# Patient Record
Sex: FEMALE | Race: WHITE | Employment: FULL TIME | URBAN - METROPOLITAN AREA
[De-identification: names, ages, dates, MRNs, and addresses within clinical notes are randomized per-mention and may not be internally consistent; named-entity substitution may affect disease eponyms.]

---

## 2019-11-07 ENCOUNTER — OFFICE VISIT (OUTPATIENT)
Dept: CARDIOLOGY CLINIC | Facility: CLINIC | Age: 39
End: 2019-11-07
Payer: COMMERCIAL

## 2019-11-07 VITALS
OXYGEN SATURATION: 99 % | HEART RATE: 68 BPM | HEIGHT: 61 IN | DIASTOLIC BLOOD PRESSURE: 80 MMHG | WEIGHT: 147 LBS | BODY MASS INDEX: 27.75 KG/M2 | SYSTOLIC BLOOD PRESSURE: 130 MMHG

## 2019-11-07 DIAGNOSIS — R06.00 DYSPNEA ON EXERTION: ICD-10-CM

## 2019-11-07 DIAGNOSIS — R00.2 HEART PALPITATIONS: Primary | ICD-10-CM

## 2019-11-07 DIAGNOSIS — R68.89 EXERCISE INTOLERANCE: ICD-10-CM

## 2019-11-07 DIAGNOSIS — E66.3 OVERWEIGHT (BMI 25.0-29.9): ICD-10-CM

## 2019-11-07 DIAGNOSIS — R07.89 CHEST TIGHTNESS: ICD-10-CM

## 2019-11-07 PROCEDURE — 93000 ELECTROCARDIOGRAM COMPLETE: CPT | Performed by: INTERNAL MEDICINE

## 2019-11-07 PROCEDURE — 99205 OFFICE O/P NEW HI 60 MIN: CPT | Performed by: INTERNAL MEDICINE

## 2019-11-07 NOTE — PATIENT INSTRUCTIONS
1  Have requested echo Doppler study, 48 hour Holter monitor, and treadmill stress test to be done in the next 1-2 weeks at [facility] and will contact patient as these results come in   2  No new medication recommended for now  3  No restrictions on activity suggested  4  Have also requested CBC, CMP, lipid panel, TSH level to be done in near future, preferably in conjunction with the above tests at [address] [place] [address]

## 2019-11-07 NOTE — PROGRESS NOTES
HISTORY & PHYSICAL  Stanton Floewrs 42 y o  female MRN: [id number]  Unit/Bed#:  Encounter: [id number]  Assessment:    1  Daily heart flutters with associated shock-like left precordial pain for just over one month  2  Chronic exertional dyspnea, pounding of heart, exercise intolerance, associated nausea  3  Overweight  4  No evidence of active cardiac disease by examination or EKG  Plan:      Patient Instructions     1  Have requested echo Doppler study, 48 hour Holter monitor, and treadmill stress test to be done in the next 1-2 weeks at [facility] and will contact patient as these results come in   2  No new medication recommended for now  3  No restrictions on activity suggested  4  Have also requested CBC, CMP, lipid panel, TSH level to be done in near future, preferably in conjunction with the above tests at [address] [place] [address]  No current outpatient medications on file  No current facility-administered medications for this visit  Counseling :   A description of the counseling / coordination of care:  Explain discrepancy between physical findings, EKG and symptoms and the need for further testing to clarify the cause       Goals and Barriers:  Diagnosis and manage the palpitations, exertional dyspnea, and chest tightness  Patient's ability to self care:  None        History of Present Illness     Chief Complaint:    HPI:     Stanton Flowers is a 42y o  year old female who presents with just over one month long history of daily flutters in the chest with associated shock like left precordial pain with episodes lasting on average for 30 seconds up to a maximum of 4 minutes and occurring about every 5 minutes  These are most noticeable during rest   She also gets an occasional flip-flop sensation in her chest of recent onset  She may have had this in the past on occasion but only for a day or so    She has never had episodes like this occurring on a daily basis for such a long period of time  This patient also has chronic exertional dyspnea and chest tightness with associated pounding of her heart and nausea, though recent analysis of her Fit Bit device showed maximum heart rate to be 106 bpm during activity  She tries to use a treadmill for exercise and also does exercise videos that last for 20-30 minutes  She finds that she has to stop and rest after 5 minutes of treadmill or exercise video activity, but then resumes her activity at a lower intensity despite the presence of the dyspnea and the pounding of the heart  She can walk in the treadmill an additional 5-10 minutes after the initial rest period  The patient denies syncope, lightheadedness, dizziness, edema, cough, sputum production, wheezing, orthopnea, paroxysmal nocturnal dyspnea, snoring, apnea  As a youngster, she did have exercise-induced asthma, but not as an adult  This patient has never been hospitalized for any medical illnesses  She has had same day lipoma excisions from her eye orbit and from the right biceps area, tonsillectomy at age 23, deviated septum surgery at age 23, cholecystectomy   The patient's family history is pertinent for maternal uncle having a massive heart attack in his 46s but is still alive at age 79 with ischemic cardiomyopathy and possible atrial fibrillation  A paternal grandmother also had heart disease and heart failure  at age 80  A paternal grandfather  at age 80 and had questionable heart disease and Parkinson's disease  She has two brothers and one sister with one brother being prediabetic  Her mother is diabetic and father has pre diabetes and hypertension  The patient works 50 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 [employer] and is on her feet for 8-9 hours a day  She lives at home with her , who works as a supervisor at [employer] and works the night shift    She has three children ages [de-identified], [de-identified], and four   Her mother and  watch the 3year-old child  She is normally off on Sundays  The patient quit smoking about 12 years ago and was a one pack per day smoker for 10 years  She rarely if ever has alcohol and has never used illicit drugs  Historical Information   History reviewed  No pertinent past medical history  Past Surgical History:   Procedure Laterality Date    CHOLECYSTECTOMY      EYE SURGERY Left     SINUS SURGERY      TONSILLECTOMY       Social History     Substance and Sexual Activity   Alcohol Use Not Currently    Frequency: Monthly or less    Comment: very little     Social History     Substance and Sexual Activity   Drug Use Not on file     Social History     Tobacco Use   Smoking Status Former Smoker   Smokeless Tobacco Never Used   Tobacco Comment    eleven years ago     Family History   Problem Relation Age of Onset    Hypertension Father     Heart disease Maternal Uncle     Heart failure Maternal Uncle        Meds/Allergies   Prior to Admission medications    Not on File     Allergies   Allergen Reactions    Acetaminophen Vomiting     Reaction Date: [id number];        Cardiovascular ROS:     Systems negative, except as noted in history of present illness  Objective   Vitals:   Vitals:    11/07/19 1033   BP: 130/80   BP Location: Right arm   Patient Position: Sitting   Cuff Size: Standard   Pulse: 68   SpO2: 99%   Weight: 66 7 kg (147 lb)   Height: 5' 1" (1 549 m)       Body mass index is 27 78 kg/m²  Physical Exam    General-Well-developed well-nourished mildly overweight  female  in no acute distress  Patient is alert, oriented X3 and cooperative  Skin-Warm and dry with no pallor, rashes, ecchymoses, lesions  HEENT-Normocephalic  Pupils equally round and reactive to light and accommodation  Extraocular muscles intact  Mucous membranes pink and moist  Sclerae nonicteric  Optic fundi poorly seen    Neck-No jugular venous distention, AJR, masses, thyromegaly, adenopathy, bruits  Lungs-No rales, rhonchi, wheezes  Heart-No murmur, gallop, rub, click, heave, thrill  Abdomen-Normal bowel sounds with no masses, organomegaly, guarding, tenderness, or rigidity  Extremities-No cyanosis, clubbing, edema, pulse decrease  Neurological-No focal neurological signs  Imaging:      EKG 11/07/2019:    Normal ECG      Chest x-ray:    No Chest XR results available for this patient  Cardiac testing:     None available      Labs:     No results found for: WBC, HGB, HCT, MCV, PLT  No results found for: SODIUM, K, CL, CO2, ANIONGAP, BUN, CREATININE, GLUCOSE, GLUF, CALCIUM, CORRECTEDCA, AST, ALT, ALKPHOS, PROT, BILITOT, EGFR  No results found for: GLUCOSE, CALCIUM, NA, K, CO2, CL, BUN, CREATININE  No results found for: BNP   No results found for: TSH  No results found for: CHOLESTEROL  No results found for: HDL  No results found for: LDLCHOLEST  No results found for: LDLCALC  No components found for: DIRECTLDLREFLEX  No results found for: TRIG  No results found for: CHOLHDL  No results found for: CKTOTAL, CKMB, CKMBINDEX, TROPONINI      Total visit time spent today 67                                      minutes

## 2019-12-17 ENCOUNTER — HOSPITAL ENCOUNTER (OUTPATIENT)
Dept: NON INVASIVE DIAGNOSTICS | Facility: HOSPITAL | Age: 39
Discharge: HOME | End: 2019-12-17
Attending: INTERNAL MEDICINE
Payer: COMMERCIAL

## 2019-12-17 DIAGNOSIS — R06.00 DYSPNEA ON EXERTION: ICD-10-CM

## 2019-12-17 DIAGNOSIS — R00.2 HEART PALPITATIONS: ICD-10-CM

## 2019-12-17 DIAGNOSIS — R07.89 CHEST TIGHTNESS: ICD-10-CM

## 2019-12-17 LAB
CHEST PAIN STATEMENT: NORMAL
MAX DIASTOLIC BP: 80 MMHG
MAX HEART RATE: 171 BPM
MAX PREDICTED HEART RATE: 183 BPM
MAX. SYSTOLIC BP: 176 MMHG
PROTOCOL NAME: NORMAL
TARGET HR FORMULA: NORMAL
TEST INDICATION: NORMAL
TIME IN EXERCISE PHASE: NORMAL

## 2019-12-17 PROCEDURE — 93225 XTRNL ECG REC<48 HRS REC: CPT

## 2019-12-17 PROCEDURE — 93017 CV STRESS TEST TRACING ONLY: CPT

## 2019-12-17 PROCEDURE — 93226 XTRNL ECG REC<48 HR SCAN A/R: CPT

## 2019-12-17 PROCEDURE — 93306 TTE W/DOPPLER COMPLETE: CPT

## 2019-12-18 PROCEDURE — 93018 CV STRESS TEST I&R ONLY: CPT | Performed by: INTERNAL MEDICINE

## 2019-12-18 PROCEDURE — 93306 TTE W/DOPPLER COMPLETE: CPT | Performed by: INTERNAL MEDICINE

## 2019-12-18 PROCEDURE — 93016 CV STRESS TEST SUPVJ ONLY: CPT | Performed by: INTERNAL MEDICINE

## 2019-12-19 ENCOUNTER — TELEPHONE (OUTPATIENT)
Dept: CARDIOLOGY CLINIC | Facility: CLINIC | Age: 39
End: 2019-12-19

## 2019-12-19 NOTE — TELEPHONE ENCOUNTER
----- Message from Sasha Curtis MD sent at 12/18/2019  7:04 PM EST -----  Notify patient that stress test and echocardiogram were both normal, and blood work from 12/03/2019 was also normal   We are awaiting the 48 hour Holter monitor report, which may take into next week before the report is analyzed  We will contact the patient with that report and any other recommendations we have at that time  For now, there is no cause for concern

## 2020-01-03 ENCOUNTER — TELEPHONE (OUTPATIENT)
Dept: CARDIOLOGY CLINIC | Facility: CLINIC | Age: 40
End: 2020-01-03

## 2020-01-03 NOTE — TELEPHONE ENCOUNTER
Please call cath lab as soon as possible and have them get Holter monitor read as soon as possible since it was done more than two weeks ago  Please place report in my in basket as soon as possible

## 2020-01-03 NOTE — TELEPHONE ENCOUNTER
Called patient to make aware that we are working on attaining report for her  I will call cath lab Monday

## 2020-01-03 NOTE — TELEPHONE ENCOUNTER
Patient called asking about holter monitor results  Explained that with the holiday it could have hindered results

## 2020-01-17 PROCEDURE — 93227 XTRNL ECG REC<48 HR R&I: CPT | Performed by: INTERNAL MEDICINE

## 2020-01-20 ENCOUNTER — TELEPHONE (OUTPATIENT)
Dept: CARDIOLOGY CLINIC | Facility: CLINIC | Age: 40
End: 2020-01-20

## 2020-01-20 NOTE — TELEPHONE ENCOUNTER
----- Message from Leandro Linn MD sent at 1/17/2020  6:36 PM EST -----  Received report of Holter monitor read on 01/17/2020  Nothing serious noted  Flutters correlated with PVCs, which were very infrequent  Previous stress test and echocardiogram were normal   If patient would like to review the information in detail, have her schedule a non urgent appointment with me to review the studies

## 2020-02-03 ENCOUNTER — OFFICE VISIT (OUTPATIENT)
Dept: CARDIOLOGY CLINIC | Facility: CLINIC | Age: 40
End: 2020-02-03
Payer: COMMERCIAL

## 2020-02-03 VITALS
SYSTOLIC BLOOD PRESSURE: 122 MMHG | BODY MASS INDEX: 27.94 KG/M2 | HEART RATE: 72 BPM | HEIGHT: 61 IN | WEIGHT: 148 LBS | OXYGEN SATURATION: 99 % | DIASTOLIC BLOOD PRESSURE: 80 MMHG

## 2020-02-03 DIAGNOSIS — R00.2 HEART PALPITATIONS: Primary | ICD-10-CM

## 2020-02-03 DIAGNOSIS — R07.89 CHEST TIGHTNESS: ICD-10-CM

## 2020-02-03 DIAGNOSIS — I49.1 PREMATURE ATRIAL CONTRACTIONS: ICD-10-CM

## 2020-02-03 DIAGNOSIS — E66.3 OVERWEIGHT (BMI 25.0-29.9): ICD-10-CM

## 2020-02-03 DIAGNOSIS — I49.3 SYMPTOMATIC PVCS: ICD-10-CM

## 2020-02-03 DIAGNOSIS — R06.00 DYSPNEA ON EXERTION: ICD-10-CM

## 2020-02-03 PROCEDURE — 99214 OFFICE O/P EST MOD 30 MIN: CPT | Performed by: INTERNAL MEDICINE

## 2020-02-03 NOTE — PATIENT INSTRUCTIONS
1  Patient does will not require further testing at this time and does not need medication  2  She is to call us if her symptoms worsen significantly  3  Repeat cardiac monitoring for 24-48 hours could be considered in approximately two years to rule out worsening dysrhythmia  This is an optional test at this point time

## 2020-02-03 NOTE — PROGRESS NOTES
Office Cardiology Progress Note  Clydene Ahumada 42 y o  female MRN: [id number]  02/03/20  3:18 PM      ASSESSMENT:    1  Infrequent PVCs as cause of heart flutters with occasional brief shock-like left precordial chest discomfort with normal treadmill stress test and echocardiogram on 12/17/2019   2  Benign overall 48 hour Holter monitor 12/17/2019 with rare PVCs and PACs and correlation of fluttering with PVCs  3  Chronic exertional dyspnea, pounding of heart, exercise intolerance, nausea, not currently problematic because of recuperation from right shoulder surgery less than one month ago  4  Chronic overweight  5  No evidence of active cardiac disease  Plan       Patient Instructions     1  Patient does will not require further testing at this time and does not need medication  2  She is to call us if her symptoms worsen significantly  3  Repeat cardiac monitoring for 24-48 hours could be considered in approximately two years to rule out worsening dysrhythmia  This is an optional test at this point time  HPI    This 42 y o  female  denies new cardiopulmonary and medical symptoms  She continues to experience occasional and sometimes daily flutters in the chest, associated shock-like left precordial discomfort, occasional flip-flops in her chest, chronic exertional dyspnea and chest tightness with pounding of her heart and nausea during exercise  The patient just had arthroscopic right shoulder surgery  on 01/07/2028 [facility] with no tendon or ligament damage  She is recuperating quite well from that  She is seen in follow-up of the below listed diagnoses  The details of all of her studies were reviewed including lab work, treadmill stress test, echocardiography, and 48 hour Holter monitor  We printed out copies of pages of her Holter monitor that showed PVCs within without reported symptoms  Those were given to her    Multiple questions were answered regarding the significance of the findings  Encounter Diagnoses   Name Primary?  Heart palpitations Yes    Symptomatic PVCs     Premature atrial contractions     Overweight (BMI 25 0-29  9)     Dyspnea on exertion     Chest tightness         Review of Systems    All other systems negative, except as noted in history of present illness    Historical Information   History reviewed  No pertinent past medical history  Past Surgical History:   Procedure Laterality Date    CHOLECYSTECTOMY      EYE SURGERY Left     SHOULDER SURGERY Right 01/07/2020    SINUS SURGERY      TONSILLECTOMY       Social History     Substance and Sexual Activity   Alcohol Use Not Currently    Frequency: Monthly or less    Comment: very little     Social History     Substance and Sexual Activity   Drug Use Not on file     Social History     Tobacco Use   Smoking Status Former Smoker   Smokeless Tobacco Never Used   Tobacco Comment    eleven years ago       Family History:  Family History   Problem Relation Age of Onset    Hypertension Father     Heart disease Maternal Uncle     Heart failure Maternal Uncle          Meds/Allergies     Prior to Admission medications    Not on File       Allergies   Allergen Reactions    Acetaminophen Vomiting     Reaction Date: [id number];          Vitals:    02/03/20 1410   BP: 122/80   BP Location: Left arm   Patient Position: Sitting   Cuff Size: Standard   Pulse: 72   SpO2: 99%   Weight: 67 1 kg (148 lb)   Height: 5' 1" (1 549 m)       Body mass index is 27 96 kg/m²  No significant change in weight compared to 11/07/2019  Physical Exam:    General Appearance:  Alert, cooperative, no distress, appears stated age and is mildly overweight     Head:  Normocephalic, without obvious abnormality, atraumatic   Eyes:  PERRL, conjunctiva/corneas clear, EOM's intact,   both eyes   Ears:  Normal TM's and external ear canals, both ears   Nose: Nares normal, septum midline, mucosa normal, no drainage or sinus tenderness   Throat: Lips, mucosa, and tongue normal; teeth and gums normal   Neck: Supple, symmetrical, trachea midline, no adenopathy, thyroid: not enlarged, symmetric, no tenderness/mass/nodules, no carotid bruit or JVD   Back:   Symmetric, no curvature, ROM normal, no CVA tenderness   Lungs:   Clear to auscultation bilaterally, respirations unlabored   Chest Wall:  No tenderness or deformity   Heart:  Regular rate and rhythm, S1, S2 normal, no murmur, rub or gallop   Abdomen:   Soft, non-tender, bowel sounds active all four quadrants,  no masses, no organomegaly with mild obesity noted  Extremities: Extremities normal, atraumatic, no cyanosis or edema   Pulses: 2+ and symmetric   Skin: Skin showed normal color, texture, turgor and no rashes or lesions   Lymph nodes: Cervical, supraclavicular, and axillary nodes normal   Neurologic: Normal         Cardiographics    ECG:    None done today  IMAGING:    No Chest XR results available for this patient            LAB REVIEW:      [facility] data from 12/03/2019:    Normal CBC  Glucose 100, calcium 8 4 with otherwise normal CMP  Cholesterol 139, , HDL 46, LDL 71  TSH 3 81        Kristin Tobar MD

## 2020-12-05 ENCOUNTER — APPOINTMENT (OUTPATIENT)
Dept: RADIOLOGY | Facility: CLINIC | Age: 40
End: 2020-12-05
Payer: COMMERCIAL

## 2020-12-05 ENCOUNTER — OFFICE VISIT (OUTPATIENT)
Dept: URGENT CARE | Facility: CLINIC | Age: 40
End: 2020-12-05
Payer: COMMERCIAL

## 2020-12-05 VITALS
WEIGHT: 162.3 LBS | OXYGEN SATURATION: 98 % | HEART RATE: 76 BPM | TEMPERATURE: 97 F | DIASTOLIC BLOOD PRESSURE: 76 MMHG | RESPIRATION RATE: 18 BRPM | BODY MASS INDEX: 30.64 KG/M2 | HEIGHT: 61 IN | SYSTOLIC BLOOD PRESSURE: 119 MMHG

## 2020-12-05 DIAGNOSIS — S99.921A FOOT INJURY, RIGHT, INITIAL ENCOUNTER: Primary | ICD-10-CM

## 2020-12-05 DIAGNOSIS — S99.921A FOOT INJURY, RIGHT, INITIAL ENCOUNTER: ICD-10-CM

## 2020-12-05 PROCEDURE — 99214 OFFICE O/P EST MOD 30 MIN: CPT | Performed by: PHYSICIAN ASSISTANT

## 2020-12-05 PROCEDURE — 73630 X-RAY EXAM OF FOOT: CPT
